# Patient Record
Sex: FEMALE | Race: WHITE
[De-identification: names, ages, dates, MRNs, and addresses within clinical notes are randomized per-mention and may not be internally consistent; named-entity substitution may affect disease eponyms.]

---

## 2018-03-08 LAB
BUN BLD-MCNC: 21 MG/DL (ref 6–20)
GLUCOSE SERPLBLD-MCNC: 86 MG/DL (ref 65–120)
HCT VFR BLD CALC: 39.7 % (ref 36–45)
INR BLD: 1.17
LYMPHOCYTES # SPEC AUTO: 1.9 K/UL (ref 0.7–4.9)
MCH RBC QN AUTO: 30.8 PG (ref 27–35)
MCV RBC: 90.9 FL (ref 80–100)
PMV BLD: 9 FL (ref 7.6–11.3)
POTASSIUM SERPL-SCNC: 4.7 MEQ/L (ref 3.6–5)
RBC # BLD: 4.37 M/UL (ref 3.86–4.86)

## 2018-03-08 NOTE — EKG
Test Date:    2018-03-08               Test Time:    13:12:51

Technician:   JANKI                                    

                                                     

MEASUREMENT RESULTS:                                       

Intervals:                                           

Rate:         73                                     

CO:           158                                    

QRSD:         72                                     

QT:           406                                    

QTc:          447                                    

Axis:                                                

P:            63                                     

CO:           158                                    

QRS:          56                                     

T:            35                                     

                                                     

INTERPRETIVE STATEMENTS:                                       

                                                     

Normal sinus rhythm

Normal ECG

Compared to ECG 10/28/2006 12:28:10

No significant changes



Electronically Signed On 03-08-18 15:06:29 CST by Orion Dumont

## 2018-03-08 NOTE — RAD REPORT
EXAM DESCRIPTION:  RADOP - Outpt Chest Pa/Lat (2 Views) - 3/8/2018 1:25 pm

 

CLINICAL HISTORY:  Preop chest

 

COMPARISON:  None.

 

TECHNIQUE:  PA and lateral views of the chest were obtained.

 

FINDINGS:  The lungs are clear of an acute infiltrate or mass. No failure or volume overload.  Heart 
size is normal and central vasculature is within normal limits. No pleural effusion or pneumothorax s
een. No acute bone findings seen. Surgical hardware is present left proximal humerus from prior fract
ure repair. There degenerative changes at the right shoulder joint. No acute aortic finding.

 

 

IMPRESSION:  No acute cardiopulmonary process.  Minimal fibrotic lung change present.

## 2018-03-14 ENCOUNTER — HOSPITAL ENCOUNTER (OUTPATIENT)
Dept: HOSPITAL 97 - OR | Age: 63
Discharge: HOME | End: 2018-03-14
Attending: ORTHOPAEDIC SURGERY
Payer: COMMERCIAL

## 2018-03-14 DIAGNOSIS — S82.851A: Primary | ICD-10-CM

## 2018-03-14 PROCEDURE — 0QSJ04Z REPOSITION RIGHT FIBULA WITH INTERNAL FIXATION DEVICE, OPEN APPROACH: ICD-10-PCS

## 2018-03-14 PROCEDURE — 73600 X-RAY EXAM OF ANKLE: CPT

## 2018-03-14 PROCEDURE — 36415 COLL VENOUS BLD VENIPUNCTURE: CPT

## 2018-03-14 PROCEDURE — 0QSG04Z REPOSITION RIGHT TIBIA WITH INTERNAL FIXATION DEVICE, OPEN APPROACH: ICD-10-PCS

## 2018-03-14 PROCEDURE — 85730 THROMBOPLASTIN TIME PARTIAL: CPT

## 2018-03-14 PROCEDURE — 80048 BASIC METABOLIC PNL TOTAL CA: CPT

## 2018-03-14 PROCEDURE — 0SSF04Z REPOSITION RIGHT ANKLE JOINT WITH INTERNAL FIXATION DEVICE, OPEN APPROACH: ICD-10-PCS

## 2018-03-14 PROCEDURE — 27829 TREAT LOWER LEG JOINT: CPT

## 2018-03-14 PROCEDURE — 85610 PROTHROMBIN TIME: CPT

## 2018-03-14 PROCEDURE — 85025 COMPLETE CBC W/AUTO DIFF WBC: CPT

## 2018-03-14 PROCEDURE — 82962 GLUCOSE BLOOD TEST: CPT

## 2018-03-14 PROCEDURE — 27822 TREATMENT OF ANKLE FRACTURE: CPT

## 2018-03-14 PROCEDURE — 27768 CLTX POST ANKLE FX W/MNPJ: CPT

## 2018-03-14 PROCEDURE — 71046 X-RAY EXAM CHEST 2 VIEWS: CPT

## 2018-03-14 PROCEDURE — 93005 ELECTROCARDIOGRAM TRACING: CPT

## 2018-03-14 PROCEDURE — 0QSGXZZ REPOSITION RIGHT TIBIA, EXTERNAL APPROACH: ICD-10-PCS

## 2018-03-14 RX ADMIN — SODIUM CHLORIDE ONE MLS: 0.9 INJECTION, SOLUTION INTRAVENOUS at 09:06

## 2018-03-14 RX ADMIN — MEPERIDINE HYDROCHLORIDE ONE MG: 50 INJECTION, SOLUTION INTRAMUSCULAR; INTRAVENOUS; SUBCUTANEOUS at 10:18

## 2018-03-14 RX ADMIN — BUPIVACAINE HYDROCHLORIDE ONE ML: 5 INJECTION, SOLUTION EPIDURAL; INTRACAUDAL; PERINEURAL at 08:25

## 2018-03-14 RX ADMIN — SODIUM CHLORIDE ONE MLS: 0.9 INJECTION, SOLUTION INTRAVENOUS at 08:43

## 2018-03-14 RX ADMIN — BUPIVACAINE HYDROCHLORIDE ONE ML: 5 INJECTION, SOLUTION EPIDURAL; INTRACAUDAL; PERINEURAL at 09:35

## 2018-03-14 RX ADMIN — MEPERIDINE HYDROCHLORIDE ONE MG: 50 INJECTION, SOLUTION INTRAMUSCULAR; INTRAVENOUS; SUBCUTANEOUS at 10:09

## 2018-03-14 RX ADMIN — MEPERIDINE HYDROCHLORIDE ONE MG: 50 INJECTION, SOLUTION INTRAMUSCULAR; INTRAVENOUS; SUBCUTANEOUS at 10:08

## 2018-03-14 NOTE — OP
Date of Procedure:  03/14/2018



Surgeon:  Scotty Pina MD



Preoperative Diagnoses:  

1.   Right trimalleolar ankle fracture.

2.   Right syndesmosis injury.



Postoperative Diagnoses:  

1.   Right trimalleolar ankle fracture.

2.   Right syndesmosis injury.



Procedure Performed:  

1.   Open reduction and internal fixation, right trimalleolar ankle fracture 
with closed treatment of posterior malleolus fracture.

2.   Open reduction and internal fixation of right syndesmosis injury.



Anesthesia:  General LMA.



Fluids:  Per Anesthesia record.



Estimated Blood Loss:  Less than 10 cc.



Tourniquet Time:  99 minutes at 250 mmHg.



Implants:  A 6-hole Biomet distal fibular locking plate, two 4.0 cannulated 
screws, and a 3.5-mm cortical screws used for syndesmosis fixation.



Indication For Procedure:  Emma is a 62-year-old female, presented to my clinic 
with history of twisting injury to her right ankle with subsequent pain, 
swelling, and deformity.  She was seen in the ER, diagnosed with a trimalleolar 
ankle fracture dislocation, it was reduced and placed in a splint and followed 
up in my clinic.  On initial visit, the patient was noted to have fracture 
blisters, which were unroofed and cleaned and dressed.  She followed up 1 week 
later with the healing of the fracture blisters, and given her unstable 
fracture pattern, I recommended open reduction and internal fixation.  I 
discussed with the patient at length risks and benefits associated with the 
procedure.  She expressed understanding and elected to proceed with operative 
treatment.



Description Of Procedure:  After informed consent was obtained, the patient was 
identified in the preoperative holding area.  The right lower extremity was 
marked.  The patient was then taken back to the operating room, transferred to 
the operative table in supine fashion, and placed under general LMA anesthesia. 
The right lower extremity was then prepped and draped in usual sterile fashion.
  A time-out was initiated.  The correct patient and procedure were confirmed 
and identified.  The patient did receive her preoperative prophylactic 
antibiotics.  The right lower extremity was then exsanguinated using an Esmarch 
and tourniquet was inflated to 250 mmHg.  Attention was first taken to the 
distal fibula where a 10-cm longitudinal incision was made at the tip of the 
distal fibula proximally.  Dissection was then taken down to the fracture site 
using Metzenbaum's.  The patient's peroneal nerve was identified and protected 
and retracted anteriorly.  The fracture site was then irrigated.  There was 
significant amount of comminution at the fracture site that was not amenable to 
lag screw fixation.  The fracture was reduced and held out to length using a 2-
point reduction clamp.  A 6-hole distal fibular locking plate was selected and 
placed over the distal fibula and proper positioning of both coronal and 
sagittal branches was confirmed using fluoroscopy.  Once in the proper position
, a single cortical screw was placed in the proximal segment in bicortical 
fashion.  A second cortical screw was placed in the distal aspect of the plate 
to help bring the plate down to the distal fibula, followed by placement of 4 
locking screws in unicortical fashion on the distal segment. There was not good 
purchase of the cortical screw plate in the distal segment and this was 
replaced with a locking screw.  Next, 3 screws were placed in the proximal 
fragment and using a 3.5 cortical screws in bicortical fashion, there was 
overall good reduction of the fracture as well as a good plate placement 
verified using fluoroscopy.  Fluoroscopy was then used and the syndesmosis was 
stressed viatcotton's test and there was minimal motion of the syndesmosis; 
however, there was displacement of syndesmosis in the preoperative images.  
First attention was taken to the medial malleolus, where a 5 cm medial 
longitudinal incision was made, centered over the medial malleolus fracture 
that was identified which was cleaned and irrigated.  There was overall good 
reduction of the fracture site noted on fluoroscopy too.  The K-wire was placed 
in a retrograde fashion in the medial malleolus into the distal tibial 
metaphysis.  Proper positioning was confirmed using fluoroscopy and two 4.0 
cannulated screws were placed without complications, with overall reduction and 
compression noted at the fracture site.  After this was completed, a reduction 
clamp was placed on the distal fibula and tibia to help keep the syndesmosis 
reduced with placement of the screw.  Four cortices were drilled through the 
distal fibular plate with the ankle internally rotated and a size 48-mm 3.5 
screw was placed with overall good purchase of distal fibula and tibia.  The 
wound was then irrigated thoroughly with normal saline.  Final x-ray images 
were taken with overall good reduction of the fracture site and good placement 
of the hardware.  The subcutaneous tissue was approximated using a 2-0 Vicryl, 
skin was approximated using a 3-0 nylon.  Sterile dressing was placed.  The 
patient was placed in a posterior stirrup splint.  Tourniquet was let down.  
The patient awakened and transferred to PACU in stable condition.



Postoperative Plan:  She will be nonweightbearing of her right lower extremity.
  She will be discharged under the care of her family.  She will follow in my 
clinic for wound check and follow up earlier with problems.





IVA/BRIAN

DD:  03/14/2018 10:33:37   Voice ID:  [id number]

DT:  03/14/2018 21:10:05   Report ID:  [id number]

[email or web address]

## 2018-03-14 NOTE — RAD REPORT
EXAM DESCRIPTION:  RAD - Ankle Right 2 View - 3/14/2018 12:16 pm

 

CLINICAL HISTORY:  Ankle fracture

 

COMPARISON:  None.

 

FINDINGS:  Fluoroscopic imaging is submitted from ORIF procedure of right ankle fracture. Details of 
the procedure are not available.

## 2018-03-14 NOTE — RAD REPORT
EXAM DESCRIPTION:  RAD - Ankle Right 2 View - 3/14/2018 10:32 am

 

CLINICAL HISTORY:  Postop ankle fracture.

 

COMPARISON:  02/25/2018

 

FINDINGS:  Lateral distal fibular sideplate with multiple screws seen. Medial malleolar lag screws ar
e also present. A syndesmotic screw noted. Bone detail is obscured by the presence of a cast. No unex
pected finding.

## 2018-03-14 NOTE — P.BOP
Preoperative diagnosis: right trimalleolar ankle fracture with syndesmosis 

injury


Postoperative diagnosis: same


Primary procedure: ORIF right bimalleolar ankle fracture


Secondary procedure: closed treatment right posterior malleolus fracture


Other procedure(s): ORIF right ankle syndesmosis


Assistant: NONE,NONE


Estimated blood loss: <10cc 


Specimen: none


Findings: see dictation


Anesthesia: General


Complications: None


Implants: 6 hole Biomet distal fibula plate, 2 4.0 cannulated screws,


Fluids & blood products: per anesthesia; TT: 99 mins @ 250 mmHg


Transferred to: Recovery Room


Condition: Good